# Patient Record
Sex: MALE | Race: WHITE
[De-identification: names, ages, dates, MRNs, and addresses within clinical notes are randomized per-mention and may not be internally consistent; named-entity substitution may affect disease eponyms.]

---
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
## 2019-10-14 ENCOUNTER — HOSPITAL ENCOUNTER (EMERGENCY)
Dept: HOSPITAL 25 - ED | Age: 19
Discharge: HOME | End: 2019-10-14
Payer: COMMERCIAL

## 2019-10-14 VITALS — SYSTOLIC BLOOD PRESSURE: 119 MMHG | DIASTOLIC BLOOD PRESSURE: 76 MMHG

## 2019-10-14 DIAGNOSIS — Z79.899: ICD-10-CM

## 2019-10-14 DIAGNOSIS — R60.0: ICD-10-CM

## 2019-10-14 DIAGNOSIS — L03.90: Primary | ICD-10-CM

## 2019-10-14 PROCEDURE — 99282 EMERGENCY DEPT VISIT SF MDM: CPT

## 2019-10-14 NOTE — ED
Lower Extremity





- HPI Summary


HPI Summary: 





Patient complains of redness and swelling to right ankle.  Has history of 

psoriasis to right ankle, and tried to use nail clipper to eliminate scaly 

skin.  States redness and swelling started today.  Denies fever, cough, sore 

throat, CV, SOB, N/V/V abdominal pain, change in urine, change in BM.  Medical 

history is none.





- History of Current Complaint


Chief Complaint: EDExtremityLower


Stated Complaint: R ANKLE INFECTION PER PT


Time Seen by Provider: 10/14/19 19:08


Hx Obtained From: Patient


Mechanism Of Injury: Other


Onset of Pain: Hours


Onset/Duration: Hours


Severity Initially: Severe


Severity Currently: Severe


Pain Intensity: 8


Pain Scale Used: 0-10 Numeric


Timing: Constant


Location: Is Discrete @


Associated Signs And Symptoms: Positive: Swelling, Redness


Aggravating Factor(s): Standing, Ambulation, Movement


Alleviating Factor(s): Rest


Able to Bear Weight: Yes





- Allergies/Home Medications


Allergies/Adverse Reactions: 


 Allergies











Allergy/AdvReac Type Severity Reaction Status Date / Time


 


No Known Allergies Allergy   Verified 10/14/19 19:24











Home Medications: 


 Home Medications





Effexor XR- 150 mg PO DAILY 10/14/19 [History Confirmed 10/14/19]











PMH/Surg Hx/FS Hx/Imm Hx


Endocrine/Hematology History: 


   Denies: Hx Diabetes


Cardiovascular History: 


   Denies: Hx Coronary Artery Disease


 History: 


   Denies: Hx Dialysis


Sensory History: 


   Denies: Hx Eye Prosthesis


Opthamlomology History: 


   Denies: Hx Legally Blind


EENT History: 


   Denies: Hx Deafness


Neurological History: Reports: Other Neuro Impairments/Disorders - Concussion


Infectious Disease History: No


Infectious Disease History: 


   Denies: Traveled Outside the [place] in Last 30 Days





- Family History


Known Family History: 


   Negative: Hypertension





- Social History


Alcohol Use: Occasionally


Alcohol Amount: tonight


Substance Use Type: Reports: Marijuana


Substance Use Comment - Amount & Last Used: daily


Smoking Status (MU): Never Smoked Tobacco





Review of Systems


Constitutional: Negative


Eyes: Negative


ENT: Negative


Cardiovascular: Negative


Respiratory: Negative


Gastrointestinal: Negative


Genitourinary: Negative


Musculoskeletal: Negative


Skin: Other


Neurological: Negative


Psychological: Normal


All Other Systems Reviewed And Are Negative: Yes





Physical Exam





- Summary


Physical Exam Summary: 





Area of erythema to lateral ankle surrounding area of dry scaly skin or 

malleolus.  Full range of motion of right ankle.  PMS intact distally.  Calf 

Nontender.


Triage Information Reviewed: Yes


Vital Signs On Initial Exam: 


 Initial Vitals











Temp Pulse Resp BP Pulse Ox


 


 97.9 F   95   16   123/76   97 


 


 10/14/19 18:04  10/14/19 18:04  10/14/19 18:04  10/14/19 18:04  10/14/19 18:04











Vital Signs Reviewed: Yes


Appearance: Positive: Well-Appearing


Skin: Positive: Warm


Head/Face: Positive: Normal Head/Face Inspection


Eyes: Positive: Normal


Neck: Positive: Supple


Respiratory/Lung Sounds: Positive: Clear to Auscultation


Cardiovascular: Positive: Normal


Abdomen Description: Positive: Nontender


Musculoskeletal: Positive: Normal


Neurological: Positive: Normal


Psychiatric: Positive: Normal


AVPU Assessment: Alert





- Olegario Coma Scale


Best Eye Response: 4 - Spontaneous


Best Motor Response: 6 - Obeys Commands


Best Verbal Response: 5 - Oriented


Coma Scale Total: 15





Procedures





- Sedation


Patient Received Moderate/Deep Sedation with Procedure: No





Diagnostics





- Vital Signs


 Vital Signs











  Temp Pulse Resp BP Pulse Ox


 


 10/14/19 18:04  97.9 F  95  16  123/76  97














- Laboratory


Lab Statement: Any lab studies that have been ordered have been reviewed, and 

results considered in the medical decision making process.





Lower Extremity Course/Dx





- Course


Course Of Treatment: Patient complains of redness and swelling to right ankle.  

Has history of psoriasis to right ankle, and tried to use nail clipper to 

eliminate scaly skin.  States redness and swelling started today.  Denies fever

, cough, sore throat, CV, SOB, N/V/V abdominal pain, change in urine, change in 

BM.  Medical history is none.  Vital signs within normal limits.  Rx for 

Bactrim.  Started here in the ED on Bactrim.





- Diagnoses


Provider Diagnoses: 


 Cellulitis








Discharge ED





- Sign-Out/Discharge


Documenting (check all that apply): Patient Departure





- Discharge Plan


Condition: Stable


Disposition: HOME


Prescriptions: 


Sulfamethox/Trimethoprim DS* [Bactrim /160 TAB*] 1 tab PO BID 10 Days #20 

tab


Patient Education Materials:  Cellulitis (ED)


Referrals: 


No Primary Care Phys,[facility] [Primary Care Provider] - 


Additional Instructions: 


Take antibiotics twice a day for 10 days.  Antibiotics may take up to 2 days to 

have noticeable effect.  Alternate ibuprofen 600 mg with Tylenol 650 mg every 3 

hours as needed for pain.  Return to the ED for any worsening symptoms.





- Billing Disposition and Condition


Condition: STABLE


Disposition: Home